# Patient Record
Sex: MALE | Race: WHITE | NOT HISPANIC OR LATINO | URBAN - METROPOLITAN AREA
[De-identification: names, ages, dates, MRNs, and addresses within clinical notes are randomized per-mention and may not be internally consistent; named-entity substitution may affect disease eponyms.]

---

## 2021-06-28 ENCOUNTER — OUTPATIENT (OUTPATIENT)
Dept: OUTPATIENT SERVICES | Facility: HOSPITAL | Age: 51
LOS: 1 days | End: 2021-06-28
Payer: COMMERCIAL

## 2021-06-28 ENCOUNTER — APPOINTMENT (OUTPATIENT)
Dept: DISASTER EMERGENCY | Facility: CLINIC | Age: 51
End: 2021-06-28

## 2021-06-28 VITALS
SYSTOLIC BLOOD PRESSURE: 140 MMHG | TEMPERATURE: 97 F | HEART RATE: 77 BPM | RESPIRATION RATE: 20 BRPM | DIASTOLIC BLOOD PRESSURE: 83 MMHG | HEIGHT: 66 IN | WEIGHT: 209.44 LBS

## 2021-06-28 DIAGNOSIS — M50.20 OTHER CERVICAL DISC DISPLACEMENT, UNSPECIFIED CERVICAL REGION: ICD-10-CM

## 2021-06-28 DIAGNOSIS — Z29.9 ENCOUNTER FOR PROPHYLACTIC MEASURES, UNSPECIFIED: ICD-10-CM

## 2021-06-28 DIAGNOSIS — I10 ESSENTIAL (PRIMARY) HYPERTENSION: ICD-10-CM

## 2021-06-28 DIAGNOSIS — Z98.890 OTHER SPECIFIED POSTPROCEDURAL STATES: Chronic | ICD-10-CM

## 2021-06-28 DIAGNOSIS — Z01.818 ENCOUNTER FOR OTHER PREPROCEDURAL EXAMINATION: ICD-10-CM

## 2021-06-28 LAB
A1C WITH ESTIMATED AVERAGE GLUCOSE RESULT: 5.8 % — HIGH (ref 4–5.6)
ANION GAP SERPL CALC-SCNC: 11 MMOL/L — SIGNIFICANT CHANGE UP (ref 5–17)
APTT BLD: 32.4 SEC — SIGNIFICANT CHANGE UP (ref 27.5–35.5)
BASOPHILS # BLD AUTO: 0.03 K/UL — SIGNIFICANT CHANGE UP (ref 0–0.2)
BASOPHILS NFR BLD AUTO: 0.4 % — SIGNIFICANT CHANGE UP (ref 0–2)
BLD GP AB SCN SERPL QL: SIGNIFICANT CHANGE UP
BUN SERPL-MCNC: 21.5 MG/DL — HIGH (ref 8–20)
CALCIUM SERPL-MCNC: 9.7 MG/DL — SIGNIFICANT CHANGE UP (ref 8.6–10.2)
CHLORIDE SERPL-SCNC: 97 MMOL/L — LOW (ref 98–107)
CO2 SERPL-SCNC: 29 MMOL/L — SIGNIFICANT CHANGE UP (ref 22–29)
CREAT SERPL-MCNC: 0.8 MG/DL — SIGNIFICANT CHANGE UP (ref 0.5–1.3)
EOSINOPHIL # BLD AUTO: 0.18 K/UL — SIGNIFICANT CHANGE UP (ref 0–0.5)
EOSINOPHIL NFR BLD AUTO: 2.4 % — SIGNIFICANT CHANGE UP (ref 0–6)
ESTIMATED AVERAGE GLUCOSE: 120 MG/DL — HIGH (ref 68–114)
GLUCOSE SERPL-MCNC: 117 MG/DL — HIGH (ref 70–99)
HCT VFR BLD CALC: 42 % — SIGNIFICANT CHANGE UP (ref 39–50)
HGB BLD-MCNC: 14.4 G/DL — SIGNIFICANT CHANGE UP (ref 13–17)
IMM GRANULOCYTES NFR BLD AUTO: 0.3 % — SIGNIFICANT CHANGE UP (ref 0–1.5)
INR BLD: 0.97 RATIO — SIGNIFICANT CHANGE UP (ref 0.88–1.16)
LYMPHOCYTES # BLD AUTO: 2.4 K/UL — SIGNIFICANT CHANGE UP (ref 1–3.3)
LYMPHOCYTES # BLD AUTO: 31.5 % — SIGNIFICANT CHANGE UP (ref 13–44)
MCHC RBC-ENTMCNC: 31.4 PG — SIGNIFICANT CHANGE UP (ref 27–34)
MCHC RBC-ENTMCNC: 34.3 GM/DL — SIGNIFICANT CHANGE UP (ref 32–36)
MCV RBC AUTO: 91.5 FL — SIGNIFICANT CHANGE UP (ref 80–100)
MONOCYTES # BLD AUTO: 0.6 K/UL — SIGNIFICANT CHANGE UP (ref 0–0.9)
MONOCYTES NFR BLD AUTO: 7.9 % — SIGNIFICANT CHANGE UP (ref 2–14)
MRSA PCR RESULT.: SIGNIFICANT CHANGE UP
NEUTROPHILS # BLD AUTO: 4.38 K/UL — SIGNIFICANT CHANGE UP (ref 1.8–7.4)
NEUTROPHILS NFR BLD AUTO: 57.5 % — SIGNIFICANT CHANGE UP (ref 43–77)
PLATELET # BLD AUTO: 272 K/UL — SIGNIFICANT CHANGE UP (ref 150–400)
POTASSIUM SERPL-MCNC: 4.3 MMOL/L — SIGNIFICANT CHANGE UP (ref 3.5–5.3)
POTASSIUM SERPL-SCNC: 4.3 MMOL/L — SIGNIFICANT CHANGE UP (ref 3.5–5.3)
PROTHROM AB SERPL-ACNC: 11.3 SEC — SIGNIFICANT CHANGE UP (ref 10.6–13.6)
RBC # BLD: 4.59 M/UL — SIGNIFICANT CHANGE UP (ref 4.2–5.8)
RBC # FLD: 12.2 % — SIGNIFICANT CHANGE UP (ref 10.3–14.5)
S AUREUS DNA NOSE QL NAA+PROBE: SIGNIFICANT CHANGE UP
SARS-COV-2 RNA SPEC QL NAA+PROBE: SIGNIFICANT CHANGE UP
SODIUM SERPL-SCNC: 137 MMOL/L — SIGNIFICANT CHANGE UP (ref 135–145)
WBC # BLD: 7.61 K/UL — SIGNIFICANT CHANGE UP (ref 3.8–10.5)
WBC # FLD AUTO: 7.61 K/UL — SIGNIFICANT CHANGE UP (ref 3.8–10.5)

## 2021-06-28 PROCEDURE — 93005 ELECTROCARDIOGRAM TRACING: CPT

## 2021-06-28 PROCEDURE — G0463: CPT

## 2021-06-28 PROCEDURE — 93010 ELECTROCARDIOGRAM REPORT: CPT

## 2021-06-28 RX ORDER — LOSARTAN POTASSIUM 100 MG/1
1 TABLET, FILM COATED ORAL
Qty: 0 | Refills: 0 | DISCHARGE

## 2021-06-28 NOTE — H&P PST ADULT - NSICDXPROBLEM_GEN_ALL_CORE_FT
PROBLEM DIAGNOSES  Problem: Cervical herniated disc  Assessment and Plan: Anterior cervical discectomy fusion C7-T 11 with Dr. Oden on 7/1/21  Patient educated on written and verbal preop instructions.       Problem: HTN (hypertension)  Assessment and Plan: routine labs and ekg  medical clearance with Dr. Gilbert [phone]    Problem: Need for prophylactic measure  Assessment and Plan: High risk,  Surgical team should assess /Strongly recommend pharmacological and mechanical measures for VTE prophylaxis

## 2021-06-28 NOTE — H&P PST ADULT - ASSESSMENT
OPIOID RISK TOOL    FED[place] EACH BOX THAT APPLIES AND ADD TOTALS AT THE END    FAMILY HISTORY OF SUBSTANCE ABUSE                 FEMALE         MALE                                                Alcohol                             [  ]1 pt          [  ]3pts                                               Illegal Durgs                     [  ]2 pts        [  ]3pts                                               Rx Drugs                           [  ]4 pts        [  ]4 pts    PERSONAL HISTORY OF SUBSTANCE ABUSE                                                                                          Alcohol                             [  ]3 pts       [  ]3 pts                                               Illegal Drugs                     [  ]4 pts        [  ]4 pts                                               Rx Drugs                           [  ]5 pts        [  ]5 pts    AGE BETWEEN 16-45 YEARS                                      [  ]1 pt         [  ]1 pt    HISTORY OF PREADOLESCENT   SEXUAL ABUSE                                                             [  ]3 pts        [  ]0pts    PSYCHOLOGICAL DISEASE                     ADD, OCD, Bipolar, Schizophrenia        [  ]2 pts         [  ]2 pts                      Depression                                               [  ]1 pt           [  ]1 pt           SCORING TOTAL   (add numbers and type here)              (***)                                     A score of 3 or lower indicated LOW risk for future opioid abuse  A score of 4 to 7 indicated moderate risk for future opioid abuse  A score of 8 or higher indicates a high risk for opioid abuse    CAPRINI SCORE [CLOT]    AGE RELATED RISK FACTORS                                                       MOBILITY RELATED FACTORS  [x ] Age 41-60 years                                            (1 Point)                  [ ] Bed rest                                                        (1 Point)  [ ] Age: 61-74 years                                           (2 Points)                 [ ] Plaster cast                                                   (2 Points)  [ ] Age= 75 years                                              (3 Points)                   [ ] Bed bound for more than 72 hours                 (2 Points)    DISEASE RELATED RISK FACTORS                                               GENDER SPECIFIC FACTORS  [ ] Edema in the lower extremities                       (1 Point)              [ ] Pregnancy                                                     (1 Point)  [x ] Varicose veins                                               (1 Point)                     [ ] Post-partum < 6 weeks                                   (1 Point)             [x ] BMI > 25 Kg/m2                                            (1 Point)                     [ ] Hormonal therapy  or oral contraception          (1 Point)                 [ ] Sepsis (in the previous month)                        (1 Point)                [ ] History of pregnancy complications                 (1 point)  [ ] Pneumonia or serious lung disease                                                [ ] Unexplained or recurrent                     (1 Point)           (in the previous month)                               (1 Point)  [ ] Abnormal pulmonary function test                     (1 Point)                 SURGERY RELATED RISK FACTORS  [ ] Acute myocardial infarction                              (1 Point)                   [ ]  Section                                             (1 Point)  [ ] Congestive heart failure (in the previous month)  (1 Point)           [ ] Minor surgery                                                  (1 Point)   [ ] Inflammatory bowel disease                             (1 Point)                   [ ] Arthroscopic surgery                                        (2 Points)  [ ] Central venous access                                      (2 Points)                    [x ] General surgery lasting more than 45 minutes   (2 Points)       [ ] Stroke (in the previous month)                          (5 Points)                 [ ] Elective arthroplasty                                         (5 Points)            [ ] Malignancy (past or present)                          (2 ponits)                                                                                                                            HEMATOLOGY RELATED FACTORS                                                 TRAUMA RELATED RISK FACTORS  [ ] Prior episodes of VTE                                     (3 Points)                [ ] Fracture of the hip, pelvis, or leg                       (5 Points)  [ ] Positive family history for VTE                         (3 Points)             [ ] Acute spinal cord injury (in the previous month)  (5 Points)  [ ] Prothrombin 72622 A                                     (3 Points)                [ ] Paralysis  (less than 1 month)                             (5 Points)  [ ] Factor V Leiden                                             (3 Points)                   [ ] Multiple Trauma within 1 month                        (5 Points)  [ ] Lupus anticoagulants                                     (3 Points)                                                           [ ] Anticardiolipin antibodies                               (3 Points)                                                       [ ] High homocysteine in the blood                      (3 Points)                                             [ ] Other congenital or acquired thrombophilia      (3 Points)                                                [ ] Heparin induced thrombocytopenia                  (3 Points)                                          Total Score [          ]    Caprini Score 0 - 2:  Low Risk, No VTE Prophylaxis required for most patients, encourage ambulation  Caprini Score 3 - 6:  At Risk, pharmacologic VTE prophylaxis is indicated for most patients (in the absence of a contraindication)  Caprini Score Greater than or = 7:  High Risk, pharmacologic VTE prophylaxis is indicated for most patients (in the absence of a contraindication)    51 year old male with h/ HTN present with neck pain that would occasionally radiated down right arm.   He had MRI that revealed cervical disc herniation . He is now schedule for anterior cervical discectomy fusion C7-T 11 with Dr. Oden on 21  Patient educated on written and verbal preop instructions.   medications reviewed, instructions given on what medications to take and what not to take.  Pt instructed to stop Herbals or anti-inflammatory meds one week prior to surgery and discuss with PMD.

## 2021-06-28 NOTE — H&P PST ADULT - NSICDXFAMILYHX_GEN_ALL_CORE_FT
FAMILY HISTORY:  FH: HTN (hypertension)    Mother  Still living? Unknown  FH: diabetes mellitus, Age at diagnosis: Age Unknown  FH: stroke, Age at diagnosis: Age Unknown

## 2021-06-28 NOTE — H&P PST ADULT - HISTORY OF PRESENT ILLNESS
51 year old male with h/ HTN present tpoday for pst. He report his neck pain started approx two months ago. He woke up one morning with neck pain that would ocssioanly radiated down right arm. he reports pain would come and go depening on movements.  He reports he tried steriods which did not improve his pain.  He had MRI that revealed cervical dics herniation  51 year old male with h/ HTN present today for pst. He report his neck pain started approx two months ago. He woke up one morning with neck pain that would ocssioanly radiated down right arm. He reports pain would come and go depening on movements.  He reports he tried steriods which did not improve his pain.  He had MRI that revealed cervical dics herniation . He is now schedule for anterior cervical discectomy fusion C7-T 11 with Dr. Oden on 7/1/21

## 2021-06-29 PROBLEM — Z00.00 ENCOUNTER FOR PREVENTIVE HEALTH EXAMINATION: Status: ACTIVE | Noted: 2021-06-29

## 2021-06-30 ENCOUNTER — TRANSCRIPTION ENCOUNTER (OUTPATIENT)
Age: 51
End: 2021-06-30

## 2021-07-01 ENCOUNTER — TRANSCRIPTION ENCOUNTER (OUTPATIENT)
Age: 51
End: 2021-07-01

## 2021-07-01 ENCOUNTER — INPATIENT (INPATIENT)
Facility: HOSPITAL | Age: 51
LOS: 0 days | Discharge: HOME | DRG: 473 | End: 2021-07-02
Attending: SPECIALIST | Admitting: SPECIALIST
Payer: COMMERCIAL

## 2021-07-01 VITALS — WEIGHT: 205.91 LBS | HEIGHT: 66 IN

## 2021-07-01 DIAGNOSIS — R73.03 PREDIABETES: ICD-10-CM

## 2021-07-01 DIAGNOSIS — Z98.890 OTHER SPECIFIED POSTPROCEDURAL STATES: Chronic | ICD-10-CM

## 2021-07-01 DIAGNOSIS — M50.20 OTHER CERVICAL DISC DISPLACEMENT, UNSPECIFIED CERVICAL REGION: ICD-10-CM

## 2021-07-01 LAB — ABO RH CONFIRMATION: SIGNIFICANT CHANGE UP

## 2021-07-01 PROCEDURE — 99222 1ST HOSP IP/OBS MODERATE 55: CPT

## 2021-07-01 RX ORDER — HYDROCHLOROTHIAZIDE 25 MG
12.5 TABLET ORAL DAILY
Refills: 0 | Status: DISCONTINUED | OUTPATIENT
Start: 2021-07-03 | End: 2021-07-02

## 2021-07-01 RX ORDER — CEFAZOLIN SODIUM 1 G
2000 VIAL (EA) INJECTION EVERY 8 HOURS
Refills: 0 | Status: DISCONTINUED | OUTPATIENT
Start: 2021-07-01 | End: 2021-07-02

## 2021-07-01 RX ORDER — ACETAMINOPHEN 500 MG
975 TABLET ORAL EVERY 8 HOURS
Refills: 0 | Status: DISCONTINUED | OUTPATIENT
Start: 2021-07-01 | End: 2021-07-02

## 2021-07-01 RX ORDER — BENZOCAINE AND MENTHOL 5; 1 G/100ML; G/100ML
1 LIQUID ORAL EVERY 4 HOURS
Refills: 0 | Status: DISCONTINUED | OUTPATIENT
Start: 2021-07-01 | End: 2021-07-02

## 2021-07-01 RX ORDER — SODIUM CHLORIDE 9 MG/ML
1000 INJECTION, SOLUTION INTRAVENOUS
Refills: 0 | Status: DISCONTINUED | OUTPATIENT
Start: 2021-07-01 | End: 2021-07-02

## 2021-07-01 RX ORDER — FENTANYL CITRATE 50 UG/ML
50 INJECTION INTRAVENOUS
Refills: 0 | Status: DISCONTINUED | OUTPATIENT
Start: 2021-07-01 | End: 2021-07-01

## 2021-07-01 RX ORDER — MAGNESIUM HYDROXIDE 400 MG/1
30 TABLET, CHEWABLE ORAL EVERY 12 HOURS
Refills: 0 | Status: DISCONTINUED | OUTPATIENT
Start: 2021-07-01 | End: 2021-07-02

## 2021-07-01 RX ORDER — SODIUM CHLORIDE 9 MG/ML
1000 INJECTION, SOLUTION INTRAVENOUS
Refills: 0 | Status: DISCONTINUED | OUTPATIENT
Start: 2021-07-01 | End: 2021-07-01

## 2021-07-01 RX ORDER — CYCLOBENZAPRINE HYDROCHLORIDE 10 MG/1
10 TABLET, FILM COATED ORAL EVERY 8 HOURS
Refills: 0 | Status: DISCONTINUED | OUTPATIENT
Start: 2021-07-01 | End: 2021-07-02

## 2021-07-01 RX ORDER — ONDANSETRON 8 MG/1
4 TABLET, FILM COATED ORAL ONCE
Refills: 0 | Status: DISCONTINUED | OUTPATIENT
Start: 2021-07-01 | End: 2021-07-01

## 2021-07-01 RX ORDER — SENNA PLUS 8.6 MG/1
2 TABLET ORAL AT BEDTIME
Refills: 0 | Status: DISCONTINUED | OUTPATIENT
Start: 2021-07-01 | End: 2021-07-02

## 2021-07-01 RX ORDER — LOSARTAN POTASSIUM 100 MG/1
100 TABLET, FILM COATED ORAL DAILY
Refills: 0 | Status: DISCONTINUED | OUTPATIENT
Start: 2021-07-03 | End: 2021-07-02

## 2021-07-01 RX ORDER — OXYCODONE HYDROCHLORIDE 5 MG/1
10 TABLET ORAL
Refills: 0 | Status: DISCONTINUED | OUTPATIENT
Start: 2021-07-01 | End: 2021-07-02

## 2021-07-01 RX ORDER — HYDROMORPHONE HYDROCHLORIDE 2 MG/ML
0.5 INJECTION INTRAMUSCULAR; INTRAVENOUS; SUBCUTANEOUS EVERY 4 HOURS
Refills: 0 | Status: DISCONTINUED | OUTPATIENT
Start: 2021-07-01 | End: 2021-07-02

## 2021-07-01 RX ORDER — FENTANYL CITRATE 50 UG/ML
50 INJECTION INTRAVENOUS
Refills: 0 | Status: DISCONTINUED | OUTPATIENT
Start: 2021-07-01 | End: 2021-07-02

## 2021-07-01 RX ORDER — HYDROMORPHONE HYDROCHLORIDE 2 MG/ML
4 INJECTION INTRAMUSCULAR; INTRAVENOUS; SUBCUTANEOUS
Refills: 0 | Status: DISCONTINUED | OUTPATIENT
Start: 2021-07-01 | End: 2021-07-02

## 2021-07-01 RX ORDER — SODIUM CHLORIDE 9 MG/ML
3 INJECTION INTRAMUSCULAR; INTRAVENOUS; SUBCUTANEOUS EVERY 8 HOURS
Refills: 0 | Status: DISCONTINUED | OUTPATIENT
Start: 2021-07-01 | End: 2021-07-01

## 2021-07-01 RX ORDER — LOSARTAN/HYDROCHLOROTHIAZIDE 100MG-25MG
1 TABLET ORAL
Qty: 0 | Refills: 0 | DISCHARGE

## 2021-07-01 RX ORDER — ONDANSETRON 8 MG/1
4 TABLET, FILM COATED ORAL EVERY 6 HOURS
Refills: 0 | Status: DISCONTINUED | OUTPATIENT
Start: 2021-07-01 | End: 2021-07-02

## 2021-07-01 RX ORDER — SODIUM CHLORIDE 9 MG/ML
1000 INJECTION INTRAMUSCULAR; INTRAVENOUS; SUBCUTANEOUS
Refills: 0 | Status: DISCONTINUED | OUTPATIENT
Start: 2021-07-01 | End: 2021-07-02

## 2021-07-01 RX ORDER — CEFAZOLIN SODIUM 1 G
2000 VIAL (EA) INJECTION ONCE
Refills: 0 | Status: COMPLETED | OUTPATIENT
Start: 2021-07-01 | End: 2021-07-01

## 2021-07-01 RX ORDER — ONDANSETRON 8 MG/1
4 TABLET, FILM COATED ORAL ONCE
Refills: 0 | Status: DISCONTINUED | OUTPATIENT
Start: 2021-07-01 | End: 2021-07-02

## 2021-07-01 RX ORDER — OXYCODONE HYDROCHLORIDE 5 MG/1
5 TABLET ORAL
Refills: 0 | Status: DISCONTINUED | OUTPATIENT
Start: 2021-07-01 | End: 2021-07-02

## 2021-07-01 RX ADMIN — Medication 975 MILLIGRAM(S): at 23:44

## 2021-07-01 RX ADMIN — Medication 975 MILLIGRAM(S): at 22:00

## 2021-07-01 RX ADMIN — Medication 975 MILLIGRAM(S): at 15:29

## 2021-07-01 RX ADMIN — SODIUM CHLORIDE 125 MILLILITER(S): 9 INJECTION INTRAMUSCULAR; INTRAVENOUS; SUBCUTANEOUS at 12:22

## 2021-07-01 RX ADMIN — Medication 100 MILLIGRAM(S): at 21:59

## 2021-07-01 RX ADMIN — SENNA PLUS 2 TABLET(S): 8.6 TABLET ORAL at 22:00

## 2021-07-01 RX ADMIN — FENTANYL CITRATE 50 MICROGRAM(S): 50 INJECTION INTRAVENOUS at 10:32

## 2021-07-01 RX ADMIN — Medication 100 MILLIGRAM(S): at 07:28

## 2021-07-01 RX ADMIN — Medication 100 MILLIGRAM(S): at 15:29

## 2021-07-01 RX ADMIN — Medication 975 MILLIGRAM(S): at 16:12

## 2021-07-01 RX ADMIN — FENTANYL CITRATE 50 MICROGRAM(S): 50 INJECTION INTRAVENOUS at 11:02

## 2021-07-01 NOTE — DISCHARGE NOTE PROVIDER - CARE PROVIDER_API CALL
Rey Oden  Orthopedic Surgery  [address]  [place] [id number]  Phone: [phone]  Fax: [phone]  Follow Up Time:

## 2021-07-01 NOTE — DISCHARGE NOTE PROVIDER - HOSPITAL COURSE
patient was admitted for elective anterior cervical discectomy and fusion of C7-T1. The post operative course was uneventful.  PT/OT Worked with patient for acute rehabilitation process. The pain was adequately controlled and patient is able to go home as she can accomplish ADL with help from family member at this time.

## 2021-07-01 NOTE — DISCHARGE NOTE PROVIDER - NSDCFUADDINST_GEN_ALL_CORE_FT
Leave occlusive dressing on wound for one week. You may then remove it and leave open to air. Protect while showering.  Showering at 48 hour is permitted.  Leave steri strips intact, they will fall off on their own or be removed on first post op visit. Physical therapy will be prescribed on second office visit.  For now, engage in light activity as tolerated, no lifting greater than 5 lb.  No driving while on pain meds.

## 2021-07-01 NOTE — CONSULT NOTE ADULT - PROBLEM/RECOMMENDATION-2
Result message sent to [facility]. Asked pt to contact clinic if any questions or concerns.   DISPLAY PLAN FREE TEXT

## 2021-07-01 NOTE — CONSULT NOTE ADULT - ASSESSMENT
51 year old male with h/ HTN neck pain x 2 months.  He reports he trial of steroids  which did not improve his pain.  He had MRI that revealed cervical dics herniation . He is now schedule S/P anterior cervical discectomy fusion C7-T 11 with Dr. Oden on 7/1/21.

## 2021-07-01 NOTE — CONSULT NOTE ADULT - PROBLEM SELECTOR RECOMMENDATION 9
S/P anterior cervical discectomy fusion C7-T 11   Post op ABX as per SCIP  Pain control/PT as per Neurosurgery  ICD's  IS

## 2021-07-01 NOTE — CONSULT NOTE ADULT - PROBLEM SELECTOR RECOMMENDATION 2
BP on low side  Continue IV fluids  Hold oral antihypertensives until POD#3 unless clinically indicated. Resume with hold parameters

## 2021-07-01 NOTE — DISCHARGE NOTE PROVIDER - NSDCMRMEDTOKEN_GEN_ALL_CORE_FT
losartan-hydrochlorothiazide 100mg-12.5mg oral tablet: 1 tab(s) orally once a day   acetaminophen 325 mg oral tablet: 3 tab(s) orally every 8 hours  cyclobenzaprine 10 mg oral tablet: 1 tab(s) orally every 8 hours, As needed, Muscle Spasm  losartan-hydrochlorothiazide 100mg-12.5mg oral tablet: 1 tab(s) orally once a day  oxyCODONE 5 mg oral tablet: 1 tab(s) orally every 6 hours, As Needed for mild to moderate pain  Senna S 50 mg-8.6 mg oral tablet: 2 tab(s) orally once a day (at bedtime)

## 2021-07-01 NOTE — DISCHARGE NOTE PROVIDER - NSDCCPCAREPLAN_GEN_ALL_CORE_FT
PRINCIPAL DISCHARGE DIAGNOSIS  Diagnosis: Cervical herniated disc  Assessment and Plan of Treatment:

## 2021-07-01 NOTE — CONSULT NOTE ADULT - ATTENDING COMMENTS
Patient seen and examined , s/p discectomy ,   tolerated procedure well .     Vital Signs Last 24 Hrs  T(C): 36.6 (01 Jul 2021 12:00), Max: 36.6 (01 Jul 2021 12:00)  T(F): 97.8 (01 Jul 2021 12:00), Max: 97.8 (01 Jul 2021 12:00)  HR: 80 (01 Jul 2021 12:00) (76 - 101)  BP: 98/65 (01 Jul 2021 12:00) (98/65 - 142/84)  BP(mean): --  ABP: --  ABP(mean): --  RR: 18 (01 Jul 2021 12:00) (10 - 18)  SpO2: 99% (01 Jul 2021 12:00) (96% - 100%)    Lungs: CTA B/L   CVS: S1S2 , no rubs , no murmurs   Abd: soft , bs+ , non tender   Neck - anterior - dry dressing + , clean and dry ,   drain +     Above noted and reviewed with NP ,   agree with above .   Thank you for the courtesy of this consult , will follow .

## 2021-07-01 NOTE — BRIEF OPERATIVE NOTE - NSICDXBRIEFPOSTOP_GEN_ALL_CORE_FT
POST-OP DIAGNOSIS:  Displacement of cervical intervertebral disc without myelopathy 01-Jul-2021 10:03:04  Rey Oden

## 2021-07-01 NOTE — BRIEF OPERATIVE NOTE - NSICDXBRIEFPREOP_GEN_ALL_CORE_FT
PRE-OP DIAGNOSIS:  Displacement of cervical intervertebral disc without myelopathy 01-Jul-2021 10:02:40  Rey Oden

## 2021-07-01 NOTE — CONSULT NOTE ADULT - SUBJECTIVE AND OBJECTIVE BOX
HPI:  51 year old male with h/ HTN neck pain x 2 months.  He reports he trial of steroids  which did not improve his pain.  He had MRI that revealed cervical dics herniation . He is now schedule S/P anterior cervical discectomy fusion C7-T 11 with Dr. Oden on 7/1/21.       PAST MEDICAL & SURGICAL HISTORY:  HTN (hypertension)    HNP (herniated nucleus pulposus), cervical    History of vocal cord polypectomy        MEDICATIONS  (STANDING):  acetaminophen   Tablet .. 975 milliGRAM(s) Oral every 8 hours  ceFAZolin   IVPB 2000 milliGRAM(s) IV Intermittent every 8 hours  senna 2 Tablet(s) Oral at bedtime  sodium chloride 0.9%. 1000 milliLiter(s) (125 mL/Hr) IV Continuous <Continuous>    MEDICATIONS  (PRN):  benzocaine 15 mG/menthol 3.6 mG (Sugar-Free) Lozenge 1 Lozenge Oral every 4 hours PRN Sore Throat  cyclobenzaprine 10 milliGRAM(s) Oral every 8 hours PRN Muscle Spasm  HYDROmorphone   Tablet 4 milliGRAM(s) Oral every 3 hours PRN Severe Pain (7 - 10)  HYDROmorphone  Injectable 0.5 milliGRAM(s) IV Push every 4 hours PRN breakthrough  magnesium hydroxide Suspension 30 milliLiter(s) Oral every 12 hours PRN Constipation  ondansetron Injectable 4 milliGRAM(s) IV Push every 6 hours PRN Nausea  oxyCODONE    IR 5 milliGRAM(s) Oral every 3 hours PRN Mild Pain (1 - 3)  oxyCODONE    IR 10 milliGRAM(s) Oral every 3 hours PRN Moderate Pain (4 - 6)      Allergies    No Known Allergies    Intolerances        SOCIAL HISTORY:  Former smoker  Social ETOH      FAMILY HISTORY:  FH: HTN (hypertension)    FH: diabetes mellitus (Mother)    FH: stroke (Mother)        Vital Signs Last 24 Hrs  T(C): 36.6 (01 Jul 2021 12:00), Max: 36.6 (01 Jul 2021 12:00)  T(F): 97.8 (01 Jul 2021 12:00), Max: 97.8 (01 Jul 2021 12:00)  HR: 80 (01 Jul 2021 12:00) (76 - 101)  BP: 98/65 (01 Jul 2021 12:00) (98/65 - 142/84)  BP(mean): --  RR: 18 (01 Jul 2021 12:00) (10 - 18)  SpO2: 99% (01 Jul 2021 12:00) (96% - 100%)    LABS:                  RADIOLOGY & ADDITIONAL STUDIES: HPI:  51 year old male with h/ HTN neck pain x 2 months.  He reports he trial of steroids  which did not improve his pain.  He had MRI that revealed cervical dics herniation . He is now schedule S/P anterior cervical discectomy fusion C7-T 11 with Dr. Oden on 7/1/21. Patient seen and examined. Sitting upright in bed. Denies pain. Has not voided post op as of yet. Denies chest pain, SOB, dizziness, nausea, vomiting, fever, chills. Still has mild numbness right hand (present prior to surgery).       PAST MEDICAL & SURGICAL HISTORY:  HTN (hypertension)    HNP (herniated nucleus pulposus), cervical    History of vocal cord polypectomy        MEDICATIONS  (STANDING):  acetaminophen   Tablet .. 975 milliGRAM(s) Oral every 8 hours  ceFAZolin   IVPB 2000 milliGRAM(s) IV Intermittent every 8 hours  senna 2 Tablet(s) Oral at bedtime  sodium chloride 0.9%. 1000 milliLiter(s) (125 mL/Hr) IV Continuous <Continuous>    MEDICATIONS  (PRN):  benzocaine 15 mG/menthol 3.6 mG (Sugar-Free) Lozenge 1 Lozenge Oral every 4 hours PRN Sore Throat  cyclobenzaprine 10 milliGRAM(s) Oral every 8 hours PRN Muscle Spasm  HYDROmorphone   Tablet 4 milliGRAM(s) Oral every 3 hours PRN Severe Pain (7 - 10)  HYDROmorphone  Injectable 0.5 milliGRAM(s) IV Push every 4 hours PRN breakthrough  magnesium hydroxide Suspension 30 milliLiter(s) Oral every 12 hours PRN Constipation  ondansetron Injectable 4 milliGRAM(s) IV Push every 6 hours PRN Nausea  oxyCODONE    IR 5 milliGRAM(s) Oral every 3 hours PRN Mild Pain (1 - 3)  oxyCODONE    IR 10 milliGRAM(s) Oral every 3 hours PRN Moderate Pain (4 - 6)      Allergies    No Known Allergies    Intolerances        SOCIAL HISTORY:  Former smoker  Social ETOH      FAMILY HISTORY:  FH: HTN (hypertension)    FH: diabetes mellitus (Mother)    FH: stroke (Mother)        Vital Signs Last 24 Hrs  T(C): 36.6 (01 Jul 2021 12:00), Max: 36.6 (01 Jul 2021 12:00)  T(F): 97.8 (01 Jul 2021 12:00), Max: 97.8 (01 Jul 2021 12:00)  HR: 80 (01 Jul 2021 12:00) (76 - 101)  BP: 98/65 (01 Jul 2021 12:00) (98/65 - 142/84)  BP(mean): --  RR: 18 (01 Jul 2021 12:00) (10 - 18)  SpO2: 99% (01 Jul 2021 12:00) (96% - 100%)    ROS:  Constitutional, Eyes, ENT, Cardiovascular, Respiratory, Gastrointestinal, Genitourinary, Musculoskeletal, Neurological ,  Integumentary, Psychiatric, Endocrine, Heme/Lymph are otherwise negative.    PHYSICAL EXAM:    General: Well developed; well nourished; in no acute distress  Eyes: PERRLA, EOMI; conjunctiva and sclera clear  Head: Normocephalic; atraumatic  ENMT: No nasal discharge; airway clear  Neck: Supple; non tender; Anterior dressing C/D/I. Hemovac in place  Respiratory: No wheezes, rales or rhonchi  Cardiovascular: Regular rate and rhythm. S1 and S2 Normal; No murmurs, gallops or rubs  Gastrointestinal: Soft non-tender non-distended; Normal bowel sounds  Extremities:  No clubbing, cyanosis or edema  Vascular: Peripheral pulses palpable 2+ bilaterally  Neurological: Alert and oriented x4  Skin: Warm and dry. No acute rash  Psychiatric: Cooperative and appropriate  LABS:                  RADIOLOGY & ADDITIONAL STUDIES:

## 2021-07-02 ENCOUNTER — TRANSCRIPTION ENCOUNTER (OUTPATIENT)
Age: 51
End: 2021-07-02

## 2021-07-02 VITALS
SYSTOLIC BLOOD PRESSURE: 153 MMHG | DIASTOLIC BLOOD PRESSURE: 83 MMHG | OXYGEN SATURATION: 98 % | HEART RATE: 91 BPM | RESPIRATION RATE: 18 BRPM | TEMPERATURE: 98 F

## 2021-07-02 LAB
ANION GAP SERPL CALC-SCNC: 10 MMOL/L — SIGNIFICANT CHANGE UP (ref 5–17)
BASOPHILS # BLD AUTO: 0.01 K/UL — SIGNIFICANT CHANGE UP (ref 0–0.2)
BASOPHILS NFR BLD AUTO: 0.1 % — SIGNIFICANT CHANGE UP (ref 0–2)
BUN SERPL-MCNC: 12.8 MG/DL — SIGNIFICANT CHANGE UP (ref 8–20)
CALCIUM SERPL-MCNC: 8.8 MG/DL — SIGNIFICANT CHANGE UP (ref 8.6–10.2)
CHLORIDE SERPL-SCNC: 105 MMOL/L — SIGNIFICANT CHANGE UP (ref 98–107)
CO2 SERPL-SCNC: 25 MMOL/L — SIGNIFICANT CHANGE UP (ref 22–29)
COVID-19 SPIKE DOMAIN AB INTERP: POSITIVE
COVID-19 SPIKE DOMAIN ANTIBODY RESULT: 100 U/ML — HIGH
CREAT SERPL-MCNC: 0.73 MG/DL — SIGNIFICANT CHANGE UP (ref 0.5–1.3)
EOSINOPHIL # BLD AUTO: 0 K/UL — SIGNIFICANT CHANGE UP (ref 0–0.5)
EOSINOPHIL NFR BLD AUTO: 0 % — SIGNIFICANT CHANGE UP (ref 0–6)
GLUCOSE SERPL-MCNC: 123 MG/DL — HIGH (ref 70–99)
HCT VFR BLD CALC: 37.1 % — LOW (ref 39–50)
HGB BLD-MCNC: 12.8 G/DL — LOW (ref 13–17)
IMM GRANULOCYTES NFR BLD AUTO: 0.5 % — SIGNIFICANT CHANGE UP (ref 0–1.5)
LYMPHOCYTES # BLD AUTO: 1.63 K/UL — SIGNIFICANT CHANGE UP (ref 1–3.3)
LYMPHOCYTES # BLD AUTO: 10.3 % — LOW (ref 13–44)
MCHC RBC-ENTMCNC: 31.4 PG — SIGNIFICANT CHANGE UP (ref 27–34)
MCHC RBC-ENTMCNC: 34.5 GM/DL — SIGNIFICANT CHANGE UP (ref 32–36)
MCV RBC AUTO: 91.2 FL — SIGNIFICANT CHANGE UP (ref 80–100)
MONOCYTES # BLD AUTO: 1.13 K/UL — HIGH (ref 0–0.9)
MONOCYTES NFR BLD AUTO: 7.2 % — SIGNIFICANT CHANGE UP (ref 2–14)
NEUTROPHILS # BLD AUTO: 12.9 K/UL — HIGH (ref 1.8–7.4)
NEUTROPHILS NFR BLD AUTO: 81.9 % — HIGH (ref 43–77)
PLATELET # BLD AUTO: 263 K/UL — SIGNIFICANT CHANGE UP (ref 150–400)
POTASSIUM SERPL-MCNC: 4.1 MMOL/L — SIGNIFICANT CHANGE UP (ref 3.5–5.3)
POTASSIUM SERPL-SCNC: 4.1 MMOL/L — SIGNIFICANT CHANGE UP (ref 3.5–5.3)
RBC # BLD: 4.07 M/UL — LOW (ref 4.2–5.8)
RBC # FLD: 12 % — SIGNIFICANT CHANGE UP (ref 10.3–14.5)
SARS-COV-2 IGG+IGM SERPL QL IA: 100 U/ML — HIGH
SARS-COV-2 IGG+IGM SERPL QL IA: POSITIVE
SODIUM SERPL-SCNC: 140 MMOL/L — SIGNIFICANT CHANGE UP (ref 135–145)
WBC # BLD: 15.75 K/UL — HIGH (ref 3.8–10.5)
WBC # FLD AUTO: 15.75 K/UL — HIGH (ref 3.8–10.5)

## 2021-07-02 PROCEDURE — 99232 SBSQ HOSP IP/OBS MODERATE 35: CPT

## 2021-07-02 PROCEDURE — 85025 COMPLETE CBC W/AUTO DIFF WBC: CPT

## 2021-07-02 PROCEDURE — C1713: CPT

## 2021-07-02 PROCEDURE — 36415 COLL VENOUS BLD VENIPUNCTURE: CPT

## 2021-07-02 PROCEDURE — 76000 FLUOROSCOPY <1 HR PHYS/QHP: CPT

## 2021-07-02 PROCEDURE — 86769 SARS-COV-2 COVID-19 ANTIBODY: CPT

## 2021-07-02 PROCEDURE — C1889: CPT

## 2021-07-02 PROCEDURE — 97163 PT EVAL HIGH COMPLEX 45 MIN: CPT

## 2021-07-02 PROCEDURE — 80048 BASIC METABOLIC PNL TOTAL CA: CPT

## 2021-07-02 RX ORDER — SENNOSIDES/DOCUSATE SODIUM 8.6MG-50MG
2 TABLET ORAL
Qty: 28 | Refills: 0
Start: 2021-07-02 | End: 2021-07-15

## 2021-07-02 RX ORDER — OXYCODONE HYDROCHLORIDE 5 MG/1
1 TABLET ORAL
Qty: 0 | Refills: 0 | DISCHARGE
Start: 2021-07-02

## 2021-07-02 RX ORDER — CYCLOBENZAPRINE HYDROCHLORIDE 10 MG/1
1 TABLET, FILM COATED ORAL
Qty: 21 | Refills: 0
Start: 2021-07-02 | End: 2021-07-08

## 2021-07-02 RX ORDER — ACETAMINOPHEN 500 MG
3 TABLET ORAL
Qty: 0 | Refills: 0 | DISCHARGE
Start: 2021-07-02

## 2021-07-02 RX ADMIN — Medication 975 MILLIGRAM(S): at 12:24

## 2021-07-02 RX ADMIN — Medication 975 MILLIGRAM(S): at 05:36

## 2021-07-02 RX ADMIN — Medication 975 MILLIGRAM(S): at 06:16

## 2021-07-02 RX ADMIN — Medication 975 MILLIGRAM(S): at 12:28

## 2021-07-02 RX ADMIN — Medication 100 MILLIGRAM(S): at 05:36

## 2021-07-02 NOTE — PROGRESS NOTE ADULT - NSPROGADDITIONALINFOA_GEN_ALL_CORE
Patient seen and examined ,   s/p anterior cervical discectomy fusion C7-T 11 , POD # 1 ,   pain well controlled , no n/v , voiding without difficulty,   participating with physical therapy .     Vital Signs Last 24 Hrs  T(C): 36.9 (02 Jul 2021 11:06), Max: 36.9 (02 Jul 2021 11:06)  T(F): 98.4 (02 Jul 2021 11:06), Max: 98.4 (02 Jul 2021 11:06)  HR: 91 (02 Jul 2021 11:06) (80 - 117)  BP: 153/83 (02 Jul 2021 11:06) (98/65 - 153/83)  BP(mean): --  ABP: --  ABP(mean): --  RR: 18 (02 Jul 2021 11:06) (18 - 18)  SpO2: 98% (02 Jul 2021 11:06) (96% - 99%)    Lungs: CTA B/L   CVS: S1S2, no rubs , no murmurs   Abd: soft , bs+ , nontender   Anterior neck - dry and clean dressing + .   Above noted and reviewed with NP .   D/W patient / nurse / ortho PA .    Medically stable to d/c once cleared by physical therapy / ortho .

## 2021-07-02 NOTE — PHYSICAL THERAPY INITIAL EVALUATION ADULT - ADDITIONAL COMMENTS
pt reports he lives in a house with no steps to enter and no steps inside with his wife and 2 children. pt reports independence with all ADLs, no assistive device needed, working and driving prior to admission. No DME at home besides a "sleep apnea machine."

## 2021-07-02 NOTE — PHYSICAL THERAPY INITIAL EVALUATION ADULT - DID THE PATIENT HAVE SURGERY?
yes Benzoyl Peroxide Counseling: Patient counseled that medicine may cause skin irritation and bleach clothing.  In the event of skin irritation, the patient was advised to reduce the amount of the drug applied or use it less frequently.   The patient verbalized understanding of the proper use and possible adverse effects of benzoyl peroxide.  All of the patient's questions and concerns were addressed.

## 2021-07-02 NOTE — PROGRESS NOTE ADULT - ASSESSMENT
51 year old male with h/ HTN neck pain x 2 months.  He reports he trial of steroids  which did not improve his pain.  He had MRI that revealed cervical dics herniation . He is now schedule S/P anterior cervical discectomy fusion C7-T 11 with Dr. Oden on 7/1/21.      Problem/Recommendation - 1:  Problem: Cervical herniated disc. Recommendation: S/P anterior cervical discectomy fusion C7-T 11   Post op ABX as per SCIP  Pain control/PT as per Neurosurgery  ICD's  IS.     Problem/Recommendation - 2:  ·  Problem: HTN (hypertension).  Recommendation:  DC IV fluids  Resume home medications on discharge     Problem/Recommendation - 3:  ·  Problem: Prediabetes.  Recommendation: HGA1c 5.8  Dietary/Lifestyle modifications.      Problem/Recommendation - 4:  ·  Problem: Need for prophylactic measure.  Recommendation: Bowel RX for opioid induced constipation ppx  DVT ppx: ICD's.  51 year old male with h/ HTN neck pain x 2 months.  He reports he trial of steroids  which did not improve his pain.  He had MRI that revealed cervical dics herniation . He is now schedule S/P anterior cervical discectomy fusion C7-T 11 with Dr. Oden on 7/1/21.      Problem/Recommendation - 1:  Problem: Cervical herniated disc. Recommendation: S/P anterior cervical discectomy fusion C7-T 11   Post op ABX as per SCIP- given   Pain control/PT as per Neurosurgery  ICD's  IS.     Problem/Recommendation - 2:  ·  Problem: HTN (hypertension).  Recommendation:  DC IV fluids  Resume home medications on discharge     Problem/Recommendation - 3:  ·  Problem: Prediabetes.  Recommendation: HGA1c 5.8  Dietary/Lifestyle modifications.      Problem/Recommendation - 4:  ·  Problem: Need for prophylactic measure.  Recommendation: Bowel RX for opioid induced constipation ppx  DVT ppx: ICD's.

## 2021-07-02 NOTE — DISCHARGE NOTE NURSING/CASE MANAGEMENT/SOCIAL WORK - PATIENT PORTAL LINK FT
You can access the FollowMyHealth Patient Portal offered by [facility] by registering at the following website: [email or web address]. By joining [employer]’s FollowMyHealth portal, you will also be able to view your health information using other applications (apps) compatible with our system.

## 2021-07-02 NOTE — PROGRESS NOTE ADULT - SUBJECTIVE AND OBJECTIVE BOX
SADIE LAL  [id number]    POST OPERATIVE DAY #:  0  STATUS POST: Anterior cervical discectomy and fusion C7-T1                      SUBJECTIVE: Patient seen and examined at bedside doing well. Patient pain is being well controlled with current prescribed pain medication. Patient states to still have numbness to right 4th/5th digit but states that preop symptoms have greatly improved. Patient participating in PT. Patient tolerating PO diet and fluids, voiding without complications. Denies headache, lightheadedness, CP, SOB, numbness/tingling to other extremities      OBJECTIVE:     Vital Signs Last 24 Hrs  T(C): 36.8 (01 Jul 2021 16:55), Max: 36.8 (01 Jul 2021 16:55)  T(F): 98.2 (01 Jul 2021 16:55), Max: 98.2 (01 Jul 2021 16:55)  HR: 94 (01 Jul 2021 17:45) (76 - 114)  BP: 114/69 (01 Jul 2021 16:55) (98/65 - 142/84)  BP(mean): --  RR: 18 (01 Jul 2021 16:55) (10 - 18)  SpO2: 97% (01 Jul 2021 16:55) (96% - 100%)  I&O's Summary    01 Jul 2021 07:01  -  01 Jul 2021 21:03  --------------------------------------------------------  IN: 0 mL / OUT: 500 mL / NET: -500 mL        Constitutional: Alert, responsive, in no acute distress.   Spine:          Dressing: clean/dry/intact          Drains:  + present with no output noted in cannister          Sensation: Decreased sensation to right 4th/5th digit. Sensation grossly intact to all other extremities             Motor exam:            Upper extremity                         Bi(c5)  WE(c6)  EE(c7)   FF(c8)                                                R         5/5        5/5        5/5       5/5                                               L          5/5        5/5        5/5       5/5                                                         Vascular:   warm well perfused; capillary refill <3 seconds. 2+ radial pulse.       A/P :  51y Male S/P Anterior cervical discectomy and fusion C7-T1  POD# 0  -    Pain control  -    DVT ppx: SCDs  -    Periop abx:  Ancef until drain removed  -    Check AM labs    -    Monitor Drain Output     -    Resume home meds  -    Physical Therapy  -    Weight bearing status: WBAT  -    Dispo planning 
Spoke with Dr Greene  He would use normal standard prophylaxis as he would for any invasive dental procedure since the pt has had a hip replacement.  This upcoming procedure is distal to the previous area of infection.      Reviewed guidelines.  Routine prophylaxis prior to dental procedures such as cleaning and treatment of cavities is not indicated to prevent orthopedic hardware infection.    Given this is an invasive procedure and pt recently had infection will treat one with omnicef 60 minutes prior to procedure.    Will contact pt via [facility] with information and to get pharmacy info.    Elizabeth Corley DO    
SADIE LAL  [id number]    POST OPERATIVE DAY #:  1  STATUS POST: Anterior cervical discectomy and fusion C7-T1                      SUBJECTIVE:   Patient seen and examined at bedside doing well.   Pt up and walking around room, feeling well  Patient pain is being well controlled with current prescribed pain medication.   Patient states to still have numbness to right 4th/5th digit which was present pre operatively.   Reports improvement of pain and weakness to upper extremities  Patient participating in PT.   Patient tolerating PO diet and fluids, voiding without complications.   Denies headache, lightheadedness, CP, SOB, numbness/tingling to other extremities    OBJECTIVE:     Vital Signs Last 24 Hrs  T(C): 36.9 (02 Jul 2021 11:06), Max: 36.9 (02 Jul 2021 11:06)  T(F): 98.4 (02 Jul 2021 11:06), Max: 98.4 (02 Jul 2021 11:06)  HR: 91 (02 Jul 2021 11:06) (76 - 117)  BP: 153/83 (02 Jul 2021 11:06) (98/65 - 153/83)  BP(mean): --  RR: 18 (02 Jul 2021 11:06) (10 - 18)  SpO2: 98% (02 Jul 2021 11:06) (96% - 99%)    Constitutional: Alert, responsive, in no acute distress.   Spine:          Dressing: clean/dry/intact          Dressing removed, steri strips in place, no drainage         Drains:  HV drain present with no output noted in cannister, drain removed         New dry sterile dressing placed         Sensation: Decreased sensation to right 4th/5th digit. Sensation grossly intact to all other extremities             Motor exam:            Upper extremity                         Bi(c5)  WE(c6)  EE(c7)   FF(c8)                                                R         5/5        5/5        5/5       5/5                                               L          5/5        5/5        5/5       5/5                                                       Vascular:   warm well perfused; capillary refill <3 seconds. 2+ radial pulse.     A/P :  51y Male S/P Anterior cervical discectomy and fusion C7-T1  POD# 1  -    Pain control  -    DVT ppx: SCDs  -    Resume home meds  -    Physical Therapy  -    Weight bearing status: WBAT  -    DC home today      
CC: Anterior cervical discectomy and fusion C7-T1     INTERVAL HPI/OVERNIGHT EVENTS: Patient seen and examined. Sitting up in chair. Drain removed. Numbness right hand improved. Ambulating without difficulty. Denies chest pain, SOB, dizziness, nausea, vomiting, fever, chills. Wants to go home.     Vital Signs Last 24 Hrs  T(C): 36.8 (02 Jul 2021 04:22), Max: 36.8 (01 Jul 2021 16:55)  T(F): 98.3 (02 Jul 2021 04:22), Max: 98.3 (02 Jul 2021 04:22)  HR: 80 (02 Jul 2021 04:22) (76 - 117)  BP: 125/75 (02 Jul 2021 04:22) (98/65 - 131/74)  BP(mean): --  RR: 18 (02 Jul 2021 04:22) (10 - 18)  SpO2: 96% (02 Jul 2021 04:22) (96% - 99%)    PHYSICAL EXAM:    General: Well developed; well nourished; in no acute distress  Eyes: PERRLA, EOMI; conjunctiva and sclera clear  Head: Normocephalic; atraumatic  ENMT: No nasal discharge; airway clear  Neck: Supple; non tender; Anterior dressing C/D/I  Respiratory: No wheezes, rales or rhonchi  Cardiovascular: Regular rate and rhythm. S1 and S2 Normal; No murmurs, gallops or rubs  Gastrointestinal: Soft non-tender non-distended; Normal bowel sounds  Extremities:  No clubbing, cyanosis or edema  Vascular: Peripheral pulses palpable 2+ bilaterally  Neurological: Alert and oriented x4  Skin: Warm and dry. No acute rash  Psychiatric: Cooperative and appropriate        I&O's Detail    01 Jul 2021 07:01  -  02 Jul 2021 07:00  --------------------------------------------------------  IN:  Total IN: 0 mL    OUT:    Drain (mL): 0 mL    Voided (mL): 500 mL  Total OUT: 500 mL    Total NET: -500 mL                                    12.8   15.75 )-----------( 263      ( 02 Jul 2021 05:58 )             37.1     02 Jul 2021 05:58    140    |  105    |  12.8   ----------------------------<  123    4.1     |  25.0   |  0.73     Ca    8.8        02 Jul 2021 05:58        CAPILLARY BLOOD GLUCOSE              MEDICATIONS  (STANDING):  acetaminophen   Tablet .. 975 milliGRAM(s) Oral every 8 hours  ceFAZolin   IVPB 2000 milliGRAM(s) IV Intermittent every 8 hours  lactated ringers. 1000 milliLiter(s) (75 mL/Hr) IV Continuous <Continuous>  senna 2 Tablet(s) Oral at bedtime  sodium chloride 0.9%. 1000 milliLiter(s) (125 mL/Hr) IV Continuous <Continuous>    MEDICATIONS  (PRN):  benzocaine 15 mG/menthol 3.6 mG (Sugar-Free) Lozenge 1 Lozenge Oral every 4 hours PRN Sore Throat  cyclobenzaprine 10 milliGRAM(s) Oral every 8 hours PRN Muscle Spasm  fentaNYL    Injectable 50 MICROGram(s) IV Push every 5 minutes PRN Severe Pain (7 - 10)  HYDROmorphone   Tablet 4 milliGRAM(s) Oral every 3 hours PRN Severe Pain (7 - 10)  HYDROmorphone  Injectable 0.5 milliGRAM(s) IV Push every 4 hours PRN breakthrough  magnesium hydroxide Suspension 30 milliLiter(s) Oral every 12 hours PRN Constipation  ondansetron Injectable 4 milliGRAM(s) IV Push every 6 hours PRN Nausea  ondansetron Injectable 4 milliGRAM(s) IV Push once PRN Nausea and/or Vomiting  oxyCODONE    IR 5 milliGRAM(s) Oral every 3 hours PRN Mild Pain (1 - 3)  oxyCODONE    IR 10 milliGRAM(s) Oral every 3 hours PRN Moderate Pain (4 - 6)      RADIOLOGY & ADDITIONAL TESTS:

## 2023-02-24 PROBLEM — M50.20 OTHER CERVICAL DISC DISPLACEMENT, UNSPECIFIED CERVICAL REGION: Chronic | Status: ACTIVE | Noted: 2021-06-28

## 2023-02-24 PROBLEM — I10 ESSENTIAL (PRIMARY) HYPERTENSION: Chronic | Status: ACTIVE | Noted: 2021-06-28

## 2023-03-24 ENCOUNTER — APPOINTMENT (OUTPATIENT)
Dept: ORTHOPEDIC SURGERY | Facility: CLINIC | Age: 53
End: 2023-03-24

## 2023-05-25 NOTE — PATIENT PROFILE ADULT - TOBACCO USE
I am sorry to hear that the psoriasis is no longer responsive to etanercept, apparently.  I do recommend a trial of a change in antitumor necrosis factor therapy prior to upcoming dermatology visit.  I recommend discontinuing Enbrel.  No less than 10 days after the last Enbrel dose, I recommend starting adalimumab (Humira) 40 mg subcu every other week.  If psoriasis and mouth sores improve with new medication, use this medication until dermatology follow-up.  I expect benefit to occur within 2-3 doses of adalimumab.   Never smoker
